# Patient Record
Sex: MALE | Race: WHITE | NOT HISPANIC OR LATINO | URBAN - METROPOLITAN AREA
[De-identification: names, ages, dates, MRNs, and addresses within clinical notes are randomized per-mention and may not be internally consistent; named-entity substitution may affect disease eponyms.]

---

## 2018-03-14 LAB
ALBUMIN: 4.7
ALP ISOS SERPL LEV INH-CCNC: 60 U/L
ALT SERPL-CCNC: 21 U/L
AST SERPL-CCNC: 25 U/L
BILIRUBIN, TOTAL: 0.5
BUN BLD-MCNC: 13 MG/DL (ref 4–21)
CHOLESTEROL, TOTAL: 172
CREAT SERPL-MCNC: 0.9 MG/DL
EGFR IF AFRICAN AMERICAN: 132
ERYTHROCYTE [DISTWIDTH] IN BLOOD BY AUTOMATED COUNT: 13 %
EST. GFR  (NON AFRICAN AMERICAN): 114 MG/DL
GGT SERPL-CCNC: 22 U/L
GLUCOSE: 103
HCT VFR BLD AUTO: 44 % (ref 41–53)
HDLC SERPL-MCNC: 62 MG/DL
HGB BLD-MCNC: 14.8 G/DL (ref 13.5–17.5)
LDLC SERPL CALC-MCNC: 100 MG/DL
LYMPHOCYTES NFR CSF MANUAL: 34 %
MCH RBC QN AUTO: 3.4 PG
MCHC RBC AUTO-ENTMCNC: 33.7 G/DL
MCV RBC AUTO: 90 FL
MONOCYTES NFR BLD: 8 %
NEUTROPHILS NFR BLD: 56 %
PLATELETS: 210
PSA FREE SERPL-MCNC: 0.4 NG/ML
RBC # BLD AUTO: 4.87 10*6/UL
TRIGL SERPL-MCNC: 50 MG/DL
VLDL CHOLESTEROL: 10 MG/DL
WBC: 6.2

## 2018-04-23 ENCOUNTER — TELEPHONE (OUTPATIENT)
Dept: FAMILY MEDICINE | Facility: CLINIC | Age: 33
End: 2018-04-23

## 2018-04-23 DIAGNOSIS — Z13.0 SCREENING FOR DEFICIENCY ANEMIA: Primary | ICD-10-CM

## 2018-04-23 DIAGNOSIS — Z13.220 SCREENING CHOLESTEROL LEVEL: ICD-10-CM

## 2018-04-23 DIAGNOSIS — Z13.1 DIABETES MELLITUS SCREENING: ICD-10-CM

## 2018-04-23 DIAGNOSIS — Z13.29 THYROID DISORDER SCREEN: ICD-10-CM

## 2018-04-23 NOTE — TELEPHONE ENCOUNTER
Pt had physical at work BP was high schedule appointment on the 4th for a physical do need labs done.

## 2018-04-23 NOTE — TELEPHONE ENCOUNTER
----- Message from Ashley Dorsey sent at 4/23/2018 10:42 AM CDT -----  Contact: Carol (wife)/ [phone]  Patient wife called in to schedule a new patient appoint and establish care with you.    Please call and advise.

## 2018-04-23 NOTE — TELEPHONE ENCOUNTER
Spoke with wife on phone.  Wellness labs sent to [facility] per her request.  Advised to have fasting labs done at least 2 days prior to office visit.

## 2018-05-04 ENCOUNTER — OFFICE VISIT (OUTPATIENT)
Dept: FAMILY MEDICINE | Facility: CLINIC | Age: 33
End: 2018-05-04
Payer: COMMERCIAL

## 2018-05-04 VITALS
SYSTOLIC BLOOD PRESSURE: 118 MMHG | OXYGEN SATURATION: 97 % | HEART RATE: 60 BPM | WEIGHT: 220.44 LBS | HEIGHT: 72 IN | DIASTOLIC BLOOD PRESSURE: 76 MMHG | TEMPERATURE: 99 F | BODY MASS INDEX: 29.86 KG/M2

## 2018-05-04 DIAGNOSIS — Z23 NEED FOR STREPTOCOCCUS PNEUMONIAE VACCINATION: ICD-10-CM

## 2018-05-04 DIAGNOSIS — Z72.0 TOBACCO USER: ICD-10-CM

## 2018-05-04 DIAGNOSIS — Z76.89 ENCOUNTER TO ESTABLISH CARE: ICD-10-CM

## 2018-05-04 DIAGNOSIS — Z00.00 NORMAL PHYSICAL EXAM: Primary | ICD-10-CM

## 2018-05-04 DIAGNOSIS — Z23 NEED FOR TDAP VACCINATION: ICD-10-CM

## 2018-05-04 PROCEDURE — 90472 IMMUNIZATION ADMIN EACH ADD: CPT | Mod: S$GLB,,, | Performed by: FAMILY MEDICINE

## 2018-05-04 PROCEDURE — 90471 IMMUNIZATION ADMIN: CPT | Mod: S$GLB,,, | Performed by: FAMILY MEDICINE

## 2018-05-04 PROCEDURE — 99203 OFFICE O/P NEW LOW 30 MIN: CPT | Mod: SA,25,S$GLB, | Performed by: NURSE PRACTITIONER

## 2018-05-04 PROCEDURE — 3008F BODY MASS INDEX DOCD: CPT | Mod: CPTII,S$GLB,, | Performed by: NURSE PRACTITIONER

## 2018-05-04 PROCEDURE — 90732 PPSV23 VACC 2 YRS+ SUBQ/IM: CPT | Mod: S$GLB,,, | Performed by: FAMILY MEDICINE

## 2018-05-04 PROCEDURE — 90715 TDAP VACCINE 7 YRS/> IM: CPT | Mod: S$GLB,,, | Performed by: FAMILY MEDICINE

## 2018-05-04 PROCEDURE — 99999 PR PBB SHADOW E&M-EST. PATIENT-LVL III: CPT | Mod: PBBFAC,,, | Performed by: NURSE PRACTITIONER

## 2018-05-04 NOTE — PROGRESS NOTES
Subjective:       Patient ID: Amari Cuevas is a 32 y.o. male.    Chief Complaint: Annual Exam (physical)    Patient is a 32-year-old white male with no significant medical history that is here today to establish care and to review work physical results.  Patient reports his blood pressure was elevated during work physical.  He states his thinks his blood pressure was running 150s over 70s.  Patient has no history of high blood pressure in the past and blood pressure is not elevated today.  Patient reports he does get anxious with blood draws etc. because he has a history where he's gotten a week one time in the past.  /76   Pulse 60   Temp 98.5 °F (36.9 °C) (Oral)   Ht 6' (1.829 m)   Wt 100 kg (220 lb 7.4 oz)   SpO2 97%   BMI 29.90 kg/m²     Work physical labs reviewed:  -  CBC WNL  -  CMP WNL other than blood sugar 103 but patient reports he did have a cup of coffee with sugar and creamer about 1 hour prior to testing.  -  Cholesterol levels were great - total cholesterol 172, HDL 62   -  PSA WNL  -  Urinalysis WNL        Previous Medications    No medications on file       History reviewed. No pertinent past medical history.    Past Surgical History:   Procedure Laterality Date    ADENOIDECTOMY      TONSILLECTOMY      at age 18       Family History   Problem Relation Age of Onset    No Known Problems Mother     Hypertension Father     No Known Problems Sister     No Known Problems Brother        Social History     Social History    Marital status:      Spouse name: N/A    Number of children: N/A    Years of education: N/A     Occupational History     at chemical [employer]      Social History Main Topics    Smoking status: Current Every Day Smoker     Packs/day: 1.00     Years: 12.00    Smokeless tobacco: Never Used    Alcohol use Yes      Comment: every weekend - 12 to 16 beers per occasion on average    Drug use: No    Sexual activity: Not Asked     Other  Topics Concern    None     Social History Narrative    None       Review of Systems   Constitutional: Negative for activity change, appetite change, fatigue, fever and unexpected weight change.   HENT: Negative for congestion, ear pain, mouth sores, nosebleeds, postnasal drip, rhinorrhea, sinus pressure, sneezing, sore throat, trouble swallowing and voice change.    Eyes: Negative.    Respiratory: Negative for cough, chest tightness and shortness of breath.    Cardiovascular: Negative for chest pain, palpitations and leg swelling.   Gastrointestinal: Negative.  Negative for abdominal pain, blood in stool, constipation, diarrhea, nausea and vomiting.   Endocrine: Negative.    Genitourinary: Negative for difficulty urinating, dysuria, flank pain, hematuria and urgency.   Musculoskeletal: Negative for arthralgias, back pain, gait problem, joint swelling, myalgias and neck pain.   Skin: Negative for color change, rash and wound.   Allergic/Immunologic: Negative for immunocompromised state.   Neurological: Negative for dizziness, tremors, seizures, syncope, speech difficulty and headaches.   Hematological: Negative for adenopathy. Does not bruise/bleed easily.   Psychiatric/Behavioral: Negative for behavioral problems, dysphoric mood, sleep disturbance and suicidal ideas. The patient is not nervous/anxious.          Objective:     Vitals:    05/04/18 1008 05/04/18 1029   BP: 120/74 118/76   BP Location: Left arm    Patient Position: Sitting    BP Method: Medium (Manual)    Pulse: 60    Temp: 98.5 °F (36.9 °C)    TempSrc: Oral    SpO2: 97%    Weight: 100 kg (220 lb 7.4 oz)    Height: 6' (1.829 m)           Physical Exam   Constitutional: He is oriented to person, place, and time. He appears well-developed and well-nourished. No distress.   Patient with muscular physique. Body mass index is 29.9 kg/m².     HENT:   Head: Normocephalic.   Right Ear: External ear normal.   Left Ear: External ear normal.   Nose: Nose normal.    Mouth/Throat: Oropharynx is clear and moist. No oropharyngeal exudate.   Eyes: EOM are normal. Pupils are equal, round, and reactive to light. Right eye exhibits no discharge. Left eye exhibits no discharge. No scleral icterus.   Neck: Normal range of motion. Neck supple. No tracheal deviation present. No thyromegaly present.   Cardiovascular: Normal rate, regular rhythm and normal heart sounds.    No murmur heard.  Pulmonary/Chest: Effort normal and breath sounds normal. No respiratory distress.   Abdominal: Soft. He exhibits no distension.   Musculoskeletal: Normal range of motion. He exhibits no edema.   Lymphadenopathy:     He has no cervical adenopathy.   Neurological: He is alert and oriented to person, place, and time. Coordination normal.   Skin: Skin is warm and dry. No rash noted. He is not diaphoretic.   Psychiatric: He has a normal mood and affect. His behavior is normal.         Assessment:         ICD-10-CM ICD-9-CM   1. Normal physical exam Z00.00 V70.9   2. Encounter to establish care Z76.89 V65.8   3. Need for Tdap vaccination Z23 V06.1   4. Need for Streptococcus pneumoniae vaccination Z23 V03.82   5. Tobacco user Z72.0 305.1       Plan:       Normal physical exam  -- Normal physical exam.  Normal blood pressure.  All labs from his work physical were reviewed.    Encounter to establish care    Need for Tdap vaccination  -     Tdap Vaccine    Need for Streptococcus pneumoniae vaccination  -     Pneumococcal Polysaccharide Vaccine (23 Valent) (SQ/IM)    Tobacco user  -  Patient has bought nicotine patches already.  He reports the step 1 made him dizzy.  Advised patient to cut the patch in half.  Because patient is down to smoking one fourth of pack daily.  Advised patient on certain cues to help with quit smoking.  -     Pneumococcal Polysaccharide Vaccine (23 Valent) (SQ/IM)      Follow-up in about 1 year (around 5/4/2019).     Patient's Medications    No medications on file

## 2018-05-09 ENCOUNTER — TELEPHONE (OUTPATIENT)
Dept: ADMINISTRATIVE | Facility: HOSPITAL | Age: 33
End: 2018-05-09

## 2021-01-04 ENCOUNTER — PATIENT MESSAGE (OUTPATIENT)
Dept: ADMINISTRATIVE | Facility: HOSPITAL | Age: 36
End: 2021-01-04

## 2021-04-05 ENCOUNTER — PATIENT MESSAGE (OUTPATIENT)
Dept: ADMINISTRATIVE | Facility: HOSPITAL | Age: 36
End: 2021-04-05

## 2021-05-04 ENCOUNTER — PATIENT MESSAGE (OUTPATIENT)
Dept: RESEARCH | Facility: HOSPITAL | Age: 36
End: 2021-05-04

## 2021-07-01 ENCOUNTER — PATIENT MESSAGE (OUTPATIENT)
Dept: ADMINISTRATIVE | Facility: OTHER | Age: 36
End: 2021-07-01